# Patient Record
Sex: MALE | Race: WHITE
[De-identification: names, ages, dates, MRNs, and addresses within clinical notes are randomized per-mention and may not be internally consistent; named-entity substitution may affect disease eponyms.]

---

## 2018-01-29 ENCOUNTER — HOSPITAL ENCOUNTER (OUTPATIENT)
Dept: HOSPITAL 88 - OR | Age: 63
Discharge: HOME | End: 2018-01-29
Attending: INTERNAL MEDICINE
Payer: COMMERCIAL

## 2018-01-29 DIAGNOSIS — E78.5: ICD-10-CM

## 2018-01-29 DIAGNOSIS — R94.5: ICD-10-CM

## 2018-01-29 DIAGNOSIS — D12.2: ICD-10-CM

## 2018-01-29 DIAGNOSIS — K44.9: ICD-10-CM

## 2018-01-29 DIAGNOSIS — K21.9: ICD-10-CM

## 2018-01-29 DIAGNOSIS — K22.70: ICD-10-CM

## 2018-01-29 DIAGNOSIS — Z86.59: ICD-10-CM

## 2018-01-29 DIAGNOSIS — K57.30: ICD-10-CM

## 2018-01-29 DIAGNOSIS — I10: ICD-10-CM

## 2018-01-29 DIAGNOSIS — D12.5: ICD-10-CM

## 2018-01-29 DIAGNOSIS — E11.9: ICD-10-CM

## 2018-01-29 DIAGNOSIS — Z01.810: ICD-10-CM

## 2018-01-29 DIAGNOSIS — B19.20: ICD-10-CM

## 2018-01-29 DIAGNOSIS — K64.8: ICD-10-CM

## 2018-01-29 DIAGNOSIS — K62.1: ICD-10-CM

## 2018-01-29 DIAGNOSIS — C15.9: Primary | ICD-10-CM

## 2018-01-29 DIAGNOSIS — K29.70: ICD-10-CM

## 2018-01-29 LAB
ANION GAP SERPL CALC-SCNC: 16 MMOL/L (ref 8–16)
BUN SERPL-MCNC: 16 MG/DL (ref 7–26)
BUN/CREAT SERPL: 13 (ref 6–25)
CALCIUM SERPL-MCNC: 9.8 MG/DL (ref 8.4–10.2)
CHLORIDE SERPL-SCNC: 100 MMOL/L (ref 98–107)
CO2 SERPL-SCNC: 28 MMOL/L (ref 22–29)
EGFRCR SERPLBLD CKD-EPI 2021: > 60 ML/MIN (ref 60–?)
GLUCOSE SERPLBLD-MCNC: 122 MG/DL (ref 74–118)
POTASSIUM SERPL-SCNC: 4 MMOL/L (ref 3.5–5.1)
SODIUM SERPL-SCNC: 140 MMOL/L (ref 136–145)

## 2018-01-29 PROCEDURE — 45385 COLONOSCOPY W/LESION REMOVAL: CPT

## 2018-01-29 PROCEDURE — 36415 COLL VENOUS BLD VENIPUNCTURE: CPT

## 2018-01-29 PROCEDURE — 43450 DILATE ESOPHAGUS 1/MULT PASS: CPT

## 2018-01-29 PROCEDURE — 80048 BASIC METABOLIC PNL TOTAL CA: CPT

## 2018-01-29 PROCEDURE — 45384 COLONOSCOPY W/LESION REMOVAL: CPT

## 2018-01-29 PROCEDURE — 93005 ELECTROCARDIOGRAM TRACING: CPT

## 2018-01-29 PROCEDURE — 43239 EGD BIOPSY SINGLE/MULTIPLE: CPT

## 2018-01-29 PROCEDURE — 82948 REAGENT STRIP/BLOOD GLUCOSE: CPT

## 2018-01-29 NOTE — OPERATIVE REPORT
DATE OF PROCEDURE:  January 29, 2018 



REFERRING PHYSICIAN:  Dr. Alyssa Fine



PROCEDURES PERFORMED

1.  Esophagogastroduodenoscopy with biopsies.  

2.  Colonoscopy with polypectomy.  



INDICATIONS FOR EGD:  Dysphagia.  



INDICATIONS FOR COLONOSCOPY:  Colorectal cancer screening and personal 

history of colon polyps. 



MEDICATION:  Patient was done under MAC.  Please see anesthesiologist's 

note.



PROCEDURE:  With the patient in the left lateral decubitus position, the 

flexible fiberoptic Olympus gastroscope was introduced into the esophagus 

under direct visualization without any difficulty.  It was advanced all the 

way to the GE junction.  An ulcerated raised lesion was noted to extend 

approximately 5 cm from the GE junction.  A segment of velvety red mucosa 

was noted to extend approximately 10 cm from the GE junction.  Multiple 

biopsies were obtained from the ulcerated nodular area.  The scope was then 

advanced with ease into the stomach traversing an approximately 4 cm size 

hiatal hernia.  Mucosa overlying the antrum and the body revealed some 

diffuse erythema and low-grade to moderate edema, and biopsies were 

obtained and sent to stain for H. pylori.  Pylorus appeared to be of normal 

contour and shape.  It was intubated with ease.  The scope was advanced all 

the way to the 2nd portion of the duodenum.  The scope was then withdrawn 

slowly.  Mucosa overlying the proximal 2nd portion and the duodenal bulb 

appeared to be within normal limits.  The scope was then withdrawn back 

into the stomach and retroflexed.  The mucosa overlying the fundus and 

cardia appeared to be within normal limits.  The scope was then 

straightened out.  The stomach was decompressed.  The scope was 

subsequently withdrawn.  Esophagus was then dilated to a size 52-Lebanese 

Deutsch.  Patient tolerated the procedure well.



IMPRESSION

1.  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 

junction.

2.  Ulcerated nodular raised area extending approximately 5 cm proximally 

from the gastroesophageal junction.  Multiple biopsies were obtained.

3.  Esophagus dilated to a size 52-Lebanese Deutsch.

4.  An approximately 4 cm hiatal hernia.

5.  Gastritis, biopsied.  Biopsies sent to stain for Helicobacter pylori.



PLAN:  Follow up histology.  Increase Nexium to 40 mg 1 p.o. a.c. b.i.d.  

CT of chest and abdomen.  



Patient was then turned around.  After adequate lubrication of the anal 

canal, a flexible fiberoptic Olympus colonoscope was inserted into the 

rectum with ease and advanced all the way to the cecum.  The scope was then 

withdrawn slowly.  Mucosa overlying the cecum appeared to be within normal 

limits.  One polyp was snared from the proximal ascending colon.  

Diverticulosis was noted also in the ascending colon.  The transverse and 

descending grossly appeared to be within normal limits.  Diverticulosis was 

noted to involve the sigmoid.  One polyp was snared from the sigmoid and 1 

polyp was hot biopsied from the rectum.  The scope was then retroflexed 

into the distal rectum, and small internal hemorrhoids were noted, none of 

which was actively bleeding.  The scope was then straightened out.  The 

rectosigmoid area, as well as the distal rectal area were decompressed.  

The scope was subsequently withdrawn.  Patient tolerated the procedure 

well.  



IMPRESSION 

1.  Ascending colon polyps, snared.

2.  Diverticulosis, ascending colon and sigmoid colon.

3.  Sigmoid colon polyp, snared.

4.  Rectal polyp, hot biopsied.

5.  Internal hemorrhoids, none actively bleeding.  





PLAN:  Follow up histology.  Initiate high-fiber and low-fat diet.  

Initiate high-fiber supplement.  Patient will need a followup colonoscopy 

in 3 years.  









DD:  01/29/2018 10:37

DT:  01/29/2018 11:04

Job#:  [id number]



cc:ALYSSA FINE DO

## 2018-01-30 ENCOUNTER — HOSPITAL ENCOUNTER (OUTPATIENT)
Dept: HOSPITAL 88 - CT | Age: 63
End: 2018-01-30
Attending: INTERNAL MEDICINE
Payer: COMMERCIAL

## 2018-01-30 DIAGNOSIS — K80.20: Primary | ICD-10-CM

## 2018-01-30 PROCEDURE — 71260 CT THORAX DX C+: CPT

## 2018-01-30 PROCEDURE — 74160 CT ABDOMEN W/CONTRAST: CPT

## 2018-01-30 NOTE — DIAGNOSTIC IMAGING REPORT
PROCEDURE: CT ABDOMEN WITH CONTRAST

 

TECHNIQUE: 

The abdomen was scanned utilizing a multidetector helical scanner from 

the diaphragm to the iliac crest after the IV administration of 100 cc 

of Isovue 370. No oral contrast was administered. Coronal and sagittal 

multiplanar reformations were obtained.

 

COMPARISON: None.

INDICATIONS:   ABNORMAL EGD

 

FINDINGS:

 

HEPATOBILIARY: No focal hepatic lesions.  No biliary ductal dilatation. 

Cholelithiasis.

SPLEEN: No splenomegaly.

PANCREAS: No focal masses or ductal dilatation.

 

ADRENALS: Fat-containing left adrenal nodule, series 2 image 73. No 

right adrenal nodule.

KIDNEYS: No hydronephrosis, stones, or solid mass lesions.

 

PERITONEUM / RETROPERITONEUM: No free air or fluid.

LYMPH NODES: No lymphadenopathy.

VESSELS: Unremarkable.

 

GI TRACT: Asymmetric density in the distal esophagus. Please see the CT 

of the chest. Visualized portions of the bowel demonstrate no 

distention or wall thickening.

 

BONES AND SOFT TISSUES: Unremarkable. Degenerative changes of the 

lumbar spine.

 

IMPRESSION:  

Cholelithiasis. 

Please see the CT of the chest for discussion of the esophageal lesion. 

 

 

Dictated by:  Héctor Lorenzana M.D. on 1/30/2018 at 9:28     

Electronically approved by:  Héctor Lorenzana M.D. on 1/30/2018 at 9:28

## 2018-01-30 NOTE — DIAGNOSTIC IMAGING REPORT
PROCEDURE:

CT scan of the chest  WITH intravenous contrast, using standard 

protocol.

 

TECHNIQUE:

The chest was scanned utilizing a multidetector helical scanner from 

the lung apex through the level of the adrenal glands after the IV 

administration of 100 cc of Isovue 370.  Coronal and sagittal 

multiplanar reformations were obtained.

 

COMPARISON: None.

INDICATIONS:  ABNORMAL EGD

    

FINDINGS:

Lines/tubes:  None.

 

Lungs and Airways:  The lungs and airways are normal with no focal 

abnormality demonstrated. 

Pleura: The pleural spaces are clear. Bibasilar atelectasis.

 

Heart and mediastinum: The thyroid gland is normal. No significant 

mediastinal, hilar or axillary lymphadenopathy is seen. The heart and 

pericardium are within normal limits.

 

Soft tissues: 1.7 cm density is present adjacent to the distal 

esophagus at the approximate region of the gastroesophageal junction, 

series 301 image 76 and series 2 image 51.  Asymmetric density is 

present in the distal esophagus, series 2 image 52.

 

Bones: The visualized bony thorax is within normal limits. Degenerative 

changes of the thoracic spine.

 

IMPRESSION: 

Asymmetric density in the esophagus may represent a malignancy. 1.7 cm 

density adjacent to the esophagus may represent a prominent lymph node. 

Correlate with EGD results. 

No mediastinal lymphadenopathy. 

 

Dictated by:  Héctor Lorenzana M.D. on 1/30/2018 at 9:16     

Electronically approved by:  Héctor Lorenzana M.D. on 1/30/2018 at 9:16

## 2018-04-09 ENCOUNTER — HOSPITAL ENCOUNTER (OUTPATIENT)
Dept: HOSPITAL 88 - CATH LAB | Age: 63
Discharge: HOME | End: 2018-04-09
Payer: COMMERCIAL

## 2018-04-09 VITALS — WEIGHT: 235 LBS | BODY MASS INDEX: 32.9 KG/M2 | HEIGHT: 71 IN

## 2018-04-09 VITALS — DIASTOLIC BLOOD PRESSURE: 87 MMHG | SYSTOLIC BLOOD PRESSURE: 121 MMHG

## 2018-04-09 VITALS — SYSTOLIC BLOOD PRESSURE: 129 MMHG | DIASTOLIC BLOOD PRESSURE: 88 MMHG

## 2018-04-09 DIAGNOSIS — Z86.19: ICD-10-CM

## 2018-04-09 DIAGNOSIS — C15.9: Primary | ICD-10-CM

## 2018-04-09 DIAGNOSIS — I10: ICD-10-CM

## 2018-04-09 PROCEDURE — 36561 INSERT TUNNELED CV CATH: CPT

## 2018-04-09 PROCEDURE — 77001 FLUOROGUIDE FOR VEIN DEVICE: CPT

## 2018-04-09 NOTE — XMS REPORT
Patient Summary Document

 Created on: 2018



ILDA VOSS

External Reference #: [id number]

: 1955

Sex: Male



Demographics







 Address  [address] [place] [address] [place]  [address]

 

 Home Phone  [phone]

 

 Preferred Language  Unknown

 

 Marital Status  Unknown

 

 Denominational Affiliation  Unknown

 

 Race  Unknown

 

 Additional Race(s)  

 

 Ethnic Group  Unknown





Author







 Author  [facility]

 

 Address  Unknown

 

 Phone  Unavailable







Care Team Providers







 Care Team Member Name  Role  Phone

 

 ELVIS SÁNCHEZ  Unavailable  Unavailable

 

 MJ GOMEZ  Unavailable  Unavailable







Problems

This patient has no known problems.



Allergies, Adverse Reactions, Alerts

This patient has no known allergies or adverse reactions.



Medications

This patient has no known medications.



Results







 Test Description  Test Time  Test Comments  Text Results  Atomic Results  
Result Comments









 POCT-GLUCOSE METER  2018 11:04:00       









   

 

 POC-GLUCOSE METER ([facility]) (test code=1538)  98 mg/dL    TESTED AT [facility]
  [address] [facility] [address]





CT CHEST W    [place] [address]      Patient Name: ILDA VOSS   MR #: 
[id number]    : 1955 Age/Sex: 62/M  Acct #: [id number] Req #: [id number]
[id number]  [facility] Physician:     Ordered by: MJ GOMEZ MD  Report #: [id number]
   Location: CT  Room/Bed:     _________________________________________________
__________________________________________________    Procedure: [id number] CT/
CT CHEST W  Exam Date: 18                            Exam Time: 0755     
  REPORT STATUS: Signed    PROCEDURE:   CT scan of the chest  WITH intravenous 
contrast, using standard    protocol.       TECHNIQUE:   The chest was scanned 
utilizing a multidetector helical scanner from    the lung apex through the 
level of the adrenal glands after the IV    administration of 100 cc of Isovue 
370.  Coronal and sagittal    multiplanar reformations were obtained.       
COMPARISON: None.   INDICATIONS:  ABNORMAL EGD          FINDINGS:   Lines/tubes
:  None.       Lungs and Airways:  The lungs and airways are normal with no 
focal    abnormality demonstrated.    Pleura: The pleural spaces are clear. 
Bibasilar atelectasis.       Heart and mediastinum: The thyroid gland is 
normal. No significant    mediastinal, hilar or axillary lymphadenopathy is 
seen. The heart and    pericardium are within normal limits.       Soft tissues
: 1.7 cm density is present adjacent to the distal    esophagus at the 
approximate region of the gastroesophageal junction,    series 301 image 76 and 
series 2 image 51.  Asymmetric density is    present in the distal esophagus, 
series 2 image 52.       Bones: The visualized bony thorax is within normal 
limits. Degenerative    changes of the thoracic spine.       IMPRESSION:    
Asymmetric density in the esophagus may represent a malignancy. 1.7 cm    
density adjacent to the esophagus may represent a prominent lymph node.    
Correlate with EGD results.    No mediastinal lymphadenopathy.        Dictated 
by:  Jesse Martínez M.D. on 2018 at 9:16        Electronically approved by:  
Jesse Martínez M.D. on 2018 at 9:16                Dictated By: JESSE MARTÍNEZ MD  Electronically Signed By: JESSE MARTÍNEZ MD on 18  Transcribed By: 
MEGAN on 18       COPY TO:   MJ GOMEZ MD        CT ABDOMEN W  
  [place] [address]      Patient Name: ILDA VOSS   MR #: [id number]    
: 1955 Age/Sex: 62/M  Acct #: [id number] Req #: [id number]  Adm 
Physician:     Ordered by: MJ GOMEZ MD  Report #: [id number]   Location: 
CT  Room/Bed:     ______________________________________________________________
_____________________________________    Procedure: [id number] CT/CT ABDOMEN W  
Exam Date: 18                            Exam Time: 0755       REPORT 
STATUS: Signed    PROCEDURE: CT ABDOMEN WITH CONTRAST       TECHNIQUE:    The 
abdomen was scanned utilizing a multidetector helical scanner from    the 
diaphragm to the iliac crest after the IV administration of 100 cc    of Isovue 
370. No oral contrast was administered. Coronal and sagittal    multiplanar 
reformations were obtained.       COMPARISON: None.   INDICATIONS:   ABNORMAL 
EGD       FINDINGS:       HEPATOBILIARY: No focal hepatic lesions.  No biliary 
ductal dilatation.    Cholelithiasis.   SPLEEN: No splenomegaly.   PANCREAS: No 
focal masses or ductal dilatation.       ADRENALS: Fat-containing left adrenal 
nodule, series 2 image 73. No    right adrenal nodule.   KIDNEYS: No 
hydronephrosis, stones, or solid mass lesions.       PERITONEUM / 
RETROPERITONEUM: No free air or fluid.   LYMPH NODES: No lymphadenopathy.   
VESSELS: Unremarkable.       GI TRACT: Asymmetric density in the distal 
esophagus. Please see the CT    of the chest. Visualized portions of the bowel 
demonstrate no    distention or wall thickening.       BONES AND SOFT TISSUES: 
Unremarkable. Degenerative changes of the    lumbar spine.       IMPRESSION:   
  Cholelithiasis.    Please see the CT of the chest for discussion of the 
esophageal lesion.            Dictated by:  Jesse Martínez M.D. on 2018 at 9:
28        Electronically approved by:  Jesse Martínez M.D. on 2018 at 9:28  
              Dictated By: JESSE MARTÍNEZ MD  Electronically Signed By: JESSE MARTÍNEZ MD on 18  Transcribed By: MEGAN on 18       COPY TO
:   MJ GOMEZ MD

## 2018-04-09 NOTE — XMS REPORT
Clinical Summary

 Created on: 2018



Wayne Walters

External Reference #: [id number]

: 1955

Sex: Male



Demographics







 Address  [address] [place] [address] [place]  [address]

 

 Home Phone  [phone]

 

 Preferred Language  English

 

 Marital Status  Unknown

 

 Pentecostal Affiliation  Muslim

 

 Race  White

 

 Ethnic Group  Non-





Author







 Author  [facility]

 

 Address  Unknown

 

 Phone  Unavailable







Support







 Name  Relationship  Address  Phone

 

 , [place]  [place]  Unknown  [phone]

 

 , [place]  [place]  Unknown  [phone]







Care Team Providers







 Care Team Member Name  Role  Phone

 

  PCP  Unavailable







Allergies

No Known Allergies



Current Medications







      



  Prescription   Sig.   Disp.   Refills   Start   End Date   Status



      Date  

 

      



  metFORMIN (GLUCOPHAGE)   Take 500 mg by mouth 2       Active



  500 MG tablet   (two) times daily with     



   breakfast and dinner.     

 

      



  losartan-hydroCHLOROthiaz   Take 1 tablet by mouth       Active



  yarelis (HYZAAR) 100-25 mg   daily.     



  per tablet      

 

      



  amLODIPine (NORVASC) 10   Take 10 mg by mouth       Active



  MG tablet   daily.     

 

      



  omeprazole (PRILOSEC) 40   Take 40 mg by mouth       Active



  MG capsule   daily.     

 

      



  vitamin E 1000 UNIT   Take 1,000 Units by mouth       Active



  capsule   daily.     

 

      



  b complex vitamins   Take 1 capsule by mouth       Active



  capsule   daily.     







Active Problems





Not on file



Encounters







    



  Date   Type   Specialty   Care Team   Description

 

    



  2018   [facility]    Denzel Jones MD 



   Encounter   

 

    



  2018   Anesthesia    Ash Malcolm 



   Event    MD David 

 

    



  2018   Procedure Pass   

 

    



  2018   Surgery    Denzel Jones MD   UPPER ENDOSCOPY,FNA



      W/ULTRASOUND

 

    



  2018   Hospital   Pre-Admission Testing  



   Encounter   



after 2017



Social History







    



  Tobacco Use   Types   Packs/Day   Years Used   Date

 

    



  Former Smoker      Quit: 1977

 

    



  Smokeless Tobacco: Former   



  User   









   



  Alcohol Use   Drinks/Week   oz/Week   Comments

 

   



  Yes   7 Shots of   4.2 



   liquor  









 



  Sex Assigned at Birth   Date Recorded

 

 



  Not on file 







Last Filed Vital Signs







  



  Vital Sign   Reading   Time Taken

 

  



  Blood Pressure   120/86   2018 11:00 AM CDT

 

  



  Pulse   55   2018 11:00 AM CDT

 

  



  Temperature   36.4   C (97.6   F)   2018 11:33 AM CDT

 

  



  Respiratory Rate   16   2018 11:33 AM CDT

 

  



  Oxygen Saturation   92%   2018 11:33 AM CDT

 

  



  Inhaled Oxygen   -   -



  Concentration  

 

  



  Weight   105.2 kg (232 lb)   2018 11:00 AM CDT

 

  



  Height   180.3 cm (5' 11")   2018 11:00 AM CDT

 

  



  Body Mass Index   32.36   2018 11:00 AM CDT







Plan of Treatment





Not on file



Procedures







    



  Procedure Name   Priority   Date/Time   Associated Diagnosis   Comments

 

    



  UPPER ENDOSCOPY,FNA    2018   Esophageal mass 



  W/ULTRASOUND    11:00 AM CDT  

 

  



   Special



   Needs



   (LINEAR



   SCOPE)



after 2017



Results

* REPORT OF PROCEDURE - ENDOSCOPY URL (2018 10:17 AM)

* POC-Glucose meter (2018 10:54 AM)





  



  Component   Value   Ref Range

 

  



  POC-Glucose Meter   98Comment: TESTED AT [facility]  [address] [facility] [address]   70 - 
110 mg/dL



   [facility] [address] 









 



  Specimen   Performing Laboratory

 

 



  Blood   [facility] [address] [address]





after 2017

## 2018-04-12 NOTE — DIAGNOSTIC IMAGING REPORT
PROCEDURE:TUNNEL CENTRAL CATH WITH PORT

 

COMPARISON:None.

 

INDICATIONS:Cancer.

 

FINDINGS:The patient was placed supine on the angiography table. The 

right neck and upper chest were prepped and draped in usual sterile 

fashion. 1% lidocaine was infused into the subcutaneous tissues for 

local anesthesia. Utilizing direct sonographic guidance, the right 

internal jugular vein was accessed with a 21 gauge needle. A 0.018 inch 

wire was advanced centrally and utilizing fluoroscopic guidance. An 

access sheath was advanced over the wire to secure the vascular access. 

The wire was upsized to a 0.035 inch wire. 1% lidocaine was infused 

into the subcutaneous tissues along the right upper chest wall. A skin 

incision was made with a #11 blade. Blunt dissection was utilized to 

create the pocket. The catheter was tunneled through the skin and out 

the venous access site. A single dilation was performed over the wire. 

A peel-away sheath was advanced over the wire. The wire and stylet were 

removed. The catheter was placed within the peel-away sheath. The 

peel-away sheath was removed. The catheter tip was positioned within 

the right atrium. The catheter demonstrated proper function of 

aspiration and flush of sterile saline. The catheter was flushed with 

sterile saline. The catheter was attached to the port. The port 

demonstrated proper function with aspiration and flushing of sterile 

saline. No leak was visualized. The catheter was flushed with sterile 

saline. The port was placed within the pocket. The pocket was flushed 

with sterile saline. The deep layers of the pocket were closed with 

interrupted 4-0 Vicryl sutures. The superficial layers were closed with 

a subcuticular running 4-0 Vicryl suture. The access site was closed 

with Dermabond. Steri-Strips were placed over the wound.

 

Sterile dressings were applied. The patient tolerated the procedure 

well. There were no immediate complications. The patient was 

transferred to the post procedure area in stable unchanged condition 

for further monitoring.

 

CONCLUSION:Successful placement of a right internal jugular tunneled 

chest port utilizing ultrasound and fluoroscopic guidance.

 

Dictated by:  Héctor Lorenzana M.D. on 4/12/2018 at 13:56     

Electronically approved by:  Héctor Lorenzana M.D. on 4/12/2018 at 13:56